# Patient Record
Sex: FEMALE | Race: OTHER | Employment: FULL TIME | URBAN - METROPOLITAN AREA
[De-identification: names, ages, dates, MRNs, and addresses within clinical notes are randomized per-mention and may not be internally consistent; named-entity substitution may affect disease eponyms.]

---

## 2017-01-07 NOTE — TELEPHONE ENCOUNTER
Dr. Mary Zavala, this pt called in requesting her results. I see that it is reviewed but there is no comment. Can you please advise.

## 2017-01-07 NOTE — TELEPHONE ENCOUNTER
There is a note by you on 12/22/2016 that the patient had an appointment with Dr Gualberto Blood on 1/7/2017. I assumed he would go over the U/S results with the patient.

## 2017-01-08 NOTE — TELEPHONE ENCOUNTER
The pelvic U-S suggests that the patient has PCOS. Patient should F-U in the office to discuss a treatment plan.

## 2017-01-09 PROBLEM — N91.2 AMENORRHEA: Status: ACTIVE | Noted: 2017-01-09

## 2017-01-09 NOTE — TELEPHONE ENCOUNTER
Pt is requesting to have a new referral to see Dr Abraham Childers (referred has )  RE-- F/U   Pt has an appt has WED 16  Please advise

## 2017-01-09 NOTE — PROGRESS NOTES
HPI:    Patient ID: Shirley Fierro is a 23year old female. HPI  43-year-old female with amenorrhea for over a year. She uses Depo-Provera with the last dose used over a year ago. She used that after conceiving.   She had breast-fed for some kaye Mother    • Parkinson's[other] Zehra Olmedo Mother       Social History:   Smoking Status: Never Smoker                      Smokeless Status: Never Used                        Alcohol Use: Yes           0.0 oz/week       0 Standard drinks or equivalent per wee

## 2017-01-11 PROBLEM — E28.2 PCOS (POLYCYSTIC OVARIAN SYNDROME): Status: ACTIVE | Noted: 2017-01-11

## 2017-01-11 NOTE — PROGRESS NOTES
[facility]  [address] [place] [id number]  Dept: [phone]  Loc: [phone]     Date:   2017    Patient:  Cosme Browning  :      1990  MRN:      [id number]    Chief SEED OR Take by mouth. Disp:  Rfl:    ibuprofen 600 MG Oral Tab Take 600 mg by mouth every 6 (six) hours as needed for Pain. Disp:  Rfl:    Probiotic Product (PROBIOTIC DAILY OR) Take by mouth.  Disp:  Rfl:      Allergies:  Codeine     Social History: water per day, Maintain a daily food journal, No drinking 30 minutes before or after meals, Utlize portion control strategies to reduce calorie intake, Identify triggers for eating and manage cues and Eat slowly and take 20 to 30 minutes to complete each m hour before bedtime. No interval changes were made in her anti-reflux medications. Pre diabetes:  Start low carb diet    Goals for next month:  1. Keep a food log. 2. Drink 48-64 ounces of non-caloric beverages per day.  No fruit juices or regular soda

## 2017-01-16 PROBLEM — N91.1 AMENORRHEA, SECONDARY: Status: ACTIVE | Noted: 2017-01-16

## 2017-01-16 NOTE — PROGRESS NOTES
HPI:    Patient ID: Ashley Bañuelos is a 23year old female. HPI  Returns to discuss lab results and plan of care. Labs showed normal fsh, tsh, and PRL. Progesterone was low.   Review of labs show that the patient's last hemoglobin A1c a few month BID.    Meds This Visit:  No prescriptions requested or ordered in this encounter       Imaging & Referrals:  None       #[id number]

## 2017-01-17 NOTE — TELEPHONE ENCOUNTER
Per pt, Dr Luli Almaraz prescribe pt her Metformin but pt needs her glucose meter, test strips and lancet send to her pharmacy on file,  Pt stts that it is her first time to use this machine.

## 2017-01-17 NOTE — TELEPHONE ENCOUNTER
Reason she is on metformin is for her polycystic ovary syndrome, not diabetes.   This is being managed by her endocrinologist.

## 2017-01-17 NOTE — TELEPHONE ENCOUNTER
Dr. Ila Zhang - see message below. I reviewed the chart and pt is getting metformin for PCOS. Do you want to order diabetes testing supplies?

## 2017-01-18 NOTE — TELEPHONE ENCOUNTER
[facility] ext [phone] today or [phone]. Thank you.

## 2017-01-18 NOTE — TELEPHONE ENCOUNTER
Recommendations per Dr. Ann-Marie Franklin reviewed. Pt verbalized understanding, agreed with information relayed, and denied further questions at this time.

## 2017-02-22 NOTE — TELEPHONE ENCOUNTER
Pt requesting immunization record but stated her  advised her she needs actual card and she doesn't have it, asking if can get a new one, please advise.

## 2017-03-08 NOTE — TELEPHONE ENCOUNTER
Patient dropped off her immunization records. Pt need her shots record on one sheet including her shots taken at the clinic for [address] [facility]. Patient travelling in 2 days. Needed by tomorrow. Contact patient when ready for pickup. Placed in Dr uTcker Herman.

## 2017-03-17 NOTE — TELEPHONE ENCOUNTER
Hopefully someone gave her her immunization records that we can print out from her computer. No need to ask me for this in the future.

## 2017-03-17 NOTE — TELEPHONE ENCOUNTER
Yes, at 23years of age patient has had a tetanus shot in 2013 and the hepatitis B injections are up-to-date along with the measles mumps and rubella.

## 2017-06-05 NOTE — TELEPHONE ENCOUNTER
Pt was concerned because she has not had a period in over a year due to pcos, explained that dr will do a pelvic to estimate how far along she is in addition to sending pt for a u/s, pt also stated she has come and go back pain that has been going on since

## 2017-06-05 NOTE — TELEPHONE ENCOUNTER
irreg periods/ back pain / poss pregnant- ap[pt for tomorrow w Dr. Valentin Verduzco she is an est pt, and having headaches looking to spk w nurse. [phone]

## 2020-03-05 ENCOUNTER — HOSPITAL ENCOUNTER (OUTPATIENT)
Age: 30
Discharge: HOME | End: 2020-03-05
Attending: EMERGENCY MEDICINE
Payer: COMMERCIAL

## 2020-03-05 VITALS
HEART RATE: 114 BPM | OXYGEN SATURATION: 98 % | TEMPERATURE: 100 F | RESPIRATION RATE: 18 BRPM | DIASTOLIC BLOOD PRESSURE: 85 MMHG | SYSTOLIC BLOOD PRESSURE: 128 MMHG

## 2020-03-05 DIAGNOSIS — J10.1 INFLUENZA B: Primary | ICD-10-CM

## 2020-03-05 LAB
POCT INFLUENZA A: NEGATIVE
POCT INFLUENZA B: POSITIVE

## 2020-03-05 PROCEDURE — 99203 OFFICE O/P NEW LOW 30 MIN: CPT

## 2020-03-05 PROCEDURE — 87502 INFLUENZA DNA AMP PROBE: CPT | Performed by: EMERGENCY MEDICINE

## 2020-03-05 PROCEDURE — 99204 OFFICE O/P NEW MOD 45 MIN: CPT

## 2020-03-05 RX ORDER — OSELTAMIVIR PHOSPHATE 75 MG/1
75 CAPSULE ORAL 2 TIMES DAILY
Qty: 10 CAPSULE | Refills: 0 | Status: SHIPPED | OUTPATIENT
Start: 2020-03-05 | End: 2020-03-10

## 2022-03-23 NOTE — ED INITIAL ASSESSMENT (HPI)
Pt here for possible stomach flu/food poisoning, pt states has been having stomach cramping, diarrhea and vomiting x 3.

## 2022-04-18 ENCOUNTER — OFFICE VISIT (OUTPATIENT)
Dept: FAMILY MEDICINE CLINIC | Facility: CLINIC | Age: 32
End: 2022-04-18
Payer: COMMERCIAL

## 2022-04-18 VITALS
HEART RATE: 71 BPM | TEMPERATURE: 98 F | DIASTOLIC BLOOD PRESSURE: 77 MMHG | HEIGHT: 63 IN | SYSTOLIC BLOOD PRESSURE: 113 MMHG | WEIGHT: 205 LBS | BODY MASS INDEX: 36.32 KG/M2

## 2022-04-18 DIAGNOSIS — E55.9 VITAMIN D DEFICIENCY: ICD-10-CM

## 2022-04-18 DIAGNOSIS — F41.9 ANXIETY: ICD-10-CM

## 2022-04-18 DIAGNOSIS — E28.2 PCOS (POLYCYSTIC OVARIAN SYNDROME): ICD-10-CM

## 2022-04-18 DIAGNOSIS — Z12.4 CERVICAL CANCER SCREENING: ICD-10-CM

## 2022-04-18 DIAGNOSIS — E66.01 SEVERE OBESITY (BMI 35.0-35.9 WITH COMORBIDITY) (HCC): ICD-10-CM

## 2022-04-18 DIAGNOSIS — Z00.00 ADULT GENERAL MEDICAL EXAM: Primary | ICD-10-CM

## 2022-04-18 PROCEDURE — 99385 PREV VISIT NEW AGE 18-39: CPT | Performed by: FAMILY MEDICINE

## 2022-04-18 PROCEDURE — 3078F DIAST BP <80 MM HG: CPT | Performed by: FAMILY MEDICINE

## 2022-04-18 PROCEDURE — 3008F BODY MASS INDEX DOCD: CPT | Performed by: FAMILY MEDICINE

## 2022-04-18 PROCEDURE — 3074F SYST BP LT 130 MM HG: CPT | Performed by: FAMILY MEDICINE

## 2022-04-18 NOTE — PATIENT INSTRUCTIONS
[facility]    [address] [address]  [phone]  [phone]

## 2022-04-21 LAB
ABSOLUTE BASOPHILS: 48 CELLS/UL (ref 0–200)
ABSOLUTE EOSINOPHILS: 162 CELLS/UL (ref 15–500)
ABSOLUTE LYMPHOCYTES: 1644 CELLS/UL (ref 850–3900)
ABSOLUTE MONOCYTES: 282 CELLS/UL (ref 200–950)
ABSOLUTE NEUTROPHILS: 3864 CELLS/UL (ref 1500–7800)
ALBUMIN/GLOBULIN RATIO: 1.6 (CALC) (ref 1–2.5)
ALBUMIN: 4.5 G/DL (ref 3.6–5.1)
ALKALINE PHOSPHATASE: 67 U/L (ref 31–125)
ALT: 158 U/L (ref 6–29)
AST: 308 U/L (ref 10–30)
BASOPHILS: 0.8 %
BILIRUBIN, TOTAL: 0.5 MG/DL (ref 0.2–1.2)
BUN: 7 MG/DL (ref 7–25)
CALCIUM: 9.3 MG/DL (ref 8.6–10.2)
CARBON DIOXIDE: 29 MMOL/L (ref 20–32)
CHLORIDE: 105 MMOL/L (ref 98–110)
CHOL/HDLC RATIO: 2.6 (CALC)
CHOLESTEROL, TOTAL: 140 MG/DL
CREATININE: 0.75 MG/DL (ref 0.5–1.1)
DHEA SULFATE: 228 MCG/DL (ref 14–349)
EGFR IF AFRICN AM: 124 ML/MIN/1.73M2
EGFR IF NONAFRICN AM: 107 ML/MIN/1.73M2
EOSINOPHILS: 2.7 %
FREE TESTOSTERONE: 4.8 PG/ML (ref 0.1–6.4)
GLOBULIN: 2.9 G/DL (CALC) (ref 1.9–3.7)
GLUCOSE: 81 MG/DL (ref 65–99)
HDL CHOLESTEROL: 54 MG/DL
HEMATOCRIT: 38.7 % (ref 35–45)
HEMOGLOBIN A1C: 5.3 % OF TOTAL HGB
HEMOGLOBIN: 12.6 G/DL (ref 11.7–15.5)
INSULIN: 10.9 UIU/ML
LDL-CHOLESTEROL: 70 MG/DL (CALC)
LYMPHOCYTES: 27.4 %
MCH: 26.5 PG (ref 27–33)
MCHC: 32.6 G/DL (ref 32–36)
MCV: 81.3 FL (ref 80–100)
MONOCYTES: 4.7 %
MPV: 10.2 FL (ref 7.5–12.5)
NEUTROPHILS: 64.4 %
NON-HDL CHOLESTEROL: 86 MG/DL (CALC)
PLATELET COUNT: 283 THOUSAND/UL (ref 140–400)
POTASSIUM: 4.8 MMOL/L (ref 3.5–5.3)
PROTEIN, TOTAL: 7.4 G/DL (ref 6.1–8.1)
RDW: 14.4 % (ref 11–15)
RED BLOOD CELL COUNT: 4.76 MILLION/UL (ref 3.8–5.1)
SODIUM: 139 MMOL/L (ref 135–146)
TESTOSTERONE, TOTAL,$/MS/MS: 57 NG/DL (ref 2–45)
TRIGLYCERIDES: 77 MG/DL
TSH W/REFLEX TO FT4: 1.03 MIU/L
VITAMIN D, 25-OH, TOTAL: 10 NG/ML (ref 30–100)
WHITE BLOOD CELL COUNT: 6 THOUSAND/UL (ref 3.8–10.8)

## 2022-05-02 ENCOUNTER — OFFICE VISIT (OUTPATIENT)
Dept: OBGYN CLINIC | Facility: CLINIC | Age: 32
End: 2022-05-02
Payer: COMMERCIAL

## 2022-05-02 VITALS
SYSTOLIC BLOOD PRESSURE: 117 MMHG | BODY MASS INDEX: 36 KG/M2 | WEIGHT: 202 LBS | HEART RATE: 63 BPM | DIASTOLIC BLOOD PRESSURE: 81 MMHG

## 2022-05-02 DIAGNOSIS — Z01.419 WOMEN'S ANNUAL ROUTINE GYNECOLOGICAL EXAMINATION: Primary | ICD-10-CM

## 2022-05-02 PROCEDURE — 99385 PREV VISIT NEW AGE 18-39: CPT | Performed by: OBSTETRICS & GYNECOLOGY

## 2022-05-02 PROCEDURE — 3079F DIAST BP 80-89 MM HG: CPT | Performed by: OBSTETRICS & GYNECOLOGY

## 2022-05-02 PROCEDURE — 3074F SYST BP LT 130 MM HG: CPT | Performed by: OBSTETRICS & GYNECOLOGY

## 2022-05-03 LAB — HPV MRNA E6/E7: NOT DETECTED

## 2022-05-10 ENCOUNTER — TELEMEDICINE (OUTPATIENT)
Dept: FAMILY MEDICINE CLINIC | Facility: CLINIC | Age: 32
End: 2022-05-10

## 2022-05-10 DIAGNOSIS — E55.9 VITAMIN D DEFICIENCY: ICD-10-CM

## 2022-05-10 DIAGNOSIS — R79.89 ELEVATED LIVER FUNCTION TESTS: Primary | ICD-10-CM

## 2022-05-10 DIAGNOSIS — E28.2 PCOS (POLYCYSTIC OVARIAN SYNDROME): ICD-10-CM

## 2022-05-10 DIAGNOSIS — R79.89 ELEVATED TESTOSTERONE LEVEL IN FEMALE: ICD-10-CM

## 2022-05-10 PROCEDURE — 99214 OFFICE O/P EST MOD 30 MIN: CPT | Performed by: FAMILY MEDICINE

## 2022-05-10 RX ORDER — ERGOCALCIFEROL 1.25 MG/1
50000 CAPSULE ORAL WEEKLY
Qty: 12 CAPSULE | Refills: 1 | Status: SHIPPED | OUTPATIENT
Start: 2022-05-10 | End: 2022-08-08

## 2022-06-28 ENCOUNTER — HOSPITAL ENCOUNTER (OUTPATIENT)
Age: 32
Discharge: HOME | End: 2022-06-28
Payer: COMMERCIAL

## 2022-06-28 VITALS
RESPIRATION RATE: 18 BRPM | SYSTOLIC BLOOD PRESSURE: 138 MMHG | TEMPERATURE: 98 F | DIASTOLIC BLOOD PRESSURE: 82 MMHG | OXYGEN SATURATION: 100 % | BODY MASS INDEX: 35.44 KG/M2 | WEIGHT: 200 LBS | HEART RATE: 86 BPM | HEIGHT: 63 IN

## 2022-06-28 DIAGNOSIS — U07.1 COVID-19: ICD-10-CM

## 2022-06-28 DIAGNOSIS — J02.9 SORE THROAT: ICD-10-CM

## 2022-06-28 DIAGNOSIS — Z20.822 ENCOUNTER FOR LABORATORY TESTING FOR COVID-19 VIRUS: Primary | ICD-10-CM

## 2022-06-28 LAB — SARS-COV-2 RNA RESP QL NAA+PROBE: DETECTED

## 2022-06-28 PROCEDURE — 99203 OFFICE O/P NEW LOW 30 MIN: CPT | Performed by: PHYSICIAN ASSISTANT

## 2022-06-28 PROCEDURE — U0002 COVID-19 LAB TEST NON-CDC: HCPCS | Performed by: PHYSICIAN ASSISTANT

## 2022-06-28 NOTE — ED INITIAL ASSESSMENT (HPI)
Patient presents a&o 4/4 with c/o sore throat, HA, chills, and body aches. Symptoms began last night.  NAD, respirations even and nonlabored

## 2022-07-11 ENCOUNTER — OFFICE VISIT (OUTPATIENT)
Dept: SURGERY | Facility: CLINIC | Age: 32
End: 2022-07-11
Payer: COMMERCIAL

## 2022-07-11 VITALS
SYSTOLIC BLOOD PRESSURE: 106 MMHG | WEIGHT: 195 LBS | DIASTOLIC BLOOD PRESSURE: 70 MMHG | HEART RATE: 63 BPM | BODY MASS INDEX: 33.29 KG/M2 | HEIGHT: 64.1 IN | OXYGEN SATURATION: 100 %

## 2022-07-11 DIAGNOSIS — E55.9 VITAMIN D DEFICIENCY: ICD-10-CM

## 2022-07-11 DIAGNOSIS — E28.2 PCOS (POLYCYSTIC OVARIAN SYNDROME): Primary | ICD-10-CM

## 2022-07-11 DIAGNOSIS — R79.89 ELEVATED LFTS: ICD-10-CM

## 2022-07-11 DIAGNOSIS — K59.00 CONSTIPATION, UNSPECIFIED CONSTIPATION TYPE: ICD-10-CM

## 2022-07-11 DIAGNOSIS — E66.9 OBESITY (BMI 30-39.9): ICD-10-CM

## 2022-07-11 PROCEDURE — 99214 OFFICE O/P EST MOD 30 MIN: CPT | Performed by: NURSE PRACTITIONER

## 2022-07-11 PROCEDURE — 3078F DIAST BP <80 MM HG: CPT | Performed by: NURSE PRACTITIONER

## 2022-07-11 PROCEDURE — 3008F BODY MASS INDEX DOCD: CPT | Performed by: NURSE PRACTITIONER

## 2022-07-11 PROCEDURE — 3074F SYST BP LT 130 MM HG: CPT | Performed by: NURSE PRACTITIONER

## 2022-07-11 RX ORDER — OMEPRAZOLE 20 MG/1
20 CAPSULE, DELAYED RELEASE ORAL DAILY
COMMUNITY
End: 2022-07-11

## 2022-07-11 RX ORDER — COVID-19 MOLECULAR TEST ASSAY
KIT MISCELLANEOUS
COMMUNITY
Start: 2022-07-02 | End: 2022-07-11

## 2022-07-11 NOTE — PATIENT INSTRUCTIONS
3 Day, 5 Day Challenge by next visit. Start 1 MD probiotic. Start magnesium glycinate 200 to 500 mg/day for sleep. Pure Encapsulations. Life Extension. NOW. Garden of Life. Or consider Natural Calm. by Blair Mathew  Follow dosage instructions. Start 1 teaspoon and increase up to 3 teaspoons as needed for sleep. Daily Calories:  Track food and beverage intake daily on a phone sushant: My Fitness Pal, Carb Manager, Lose It, My Net Diary   120-174 lbs: 1200 calories/day. 175-219 lbs: 1500 calories/day. 220-249 lbs: 1800 calories/day. 250 lbs or more: 2,000 calories/day. Record food and drink intake on a written log or phone sushant:   Aim for  grams of carbs/day. Carb Manager. Aim for a whole, plant strong, low glycemic index dietary pattern. Aim for protein + produce at each meal time. Keep meals between 7 am and 7 pm.    Aim for 3 healthy meals a day with 1-2 healthy snacks as needed. Daily- Aim for:  2 fruits: tomato, avocado, apples, oranges, berries   4 handfuls non-starchy vegetables: greens, peppers, onion, garlic, broccoli, cauliflower, asparagus, brussels sprouts   2 starches: real potato (sweet, white), green beans, carrots, corn, beans, lentils, chickpeas, quinoa, cous cous  3 protein per day: nuts, seeds, plants (lentils, chickpeas, beans), chicken, fish, seafood, turkey, pork, red meat (limited)  Aim for 2 servings healthy fats per day: olives, fatty fish, olive oil, seeds, nuts, avocado, coconut oil. Breakfast ideas:  1. Fruit and nuts/seeds. 2. Eggs scrambled with vegetables, cottage cheese. 3. Oatmeal (stovetop), cinnamon, 2 tbsp flaxseed, berries. 4. Protein shake: 1-2 scoops protein powder, shake with ice and water. Garden of Life. Nutiva. Orgain. Aim for protein and vegetables for lunch and dinner. Healthy Plate method:   1/2 vegetables, 1/4 protein, 1/4 healthy starch (may decrease this portion).      Snacks:  Raw vegetables and hummus, apples and peanut butter, nuts, seeds, fruit, pecans with organic honey drizzled. 1. Do a house cleanse, remove unhealthy foods from the house. 2. Aim to eat a whole, plant strong, low glycemic index diet. 3. Focus on the quality of your diet. 4. Get in the habit of recording everything you eat and drink using a food log.  5. Write down all your meals/drinks to stay accountable for what you eat and drink. 6. Find a regular pattern for meals. Aim for 3 healthy meals a day with 1-2 healthy snacks. 7. Plan when, where, and what you will eat at each meal in advance to make sure you do not go too many hours without eating. 8. Include lean protein throughout the day to help steady your appetite. 9. Eat at least 4 servings of vegetables and 2 servings for fruits each day. 10. Add fiber to slow down digestion and keep you full longer. 11. Cut out sugar sweetened beverages. 12. Avoid highly processed carbohydrates. 13. Use the healthy plate method as a reference: Lunch & Dinner plate: 1/2 vegetables (and some fruit), 1/4 protein, 1/4 healthy starch (may decrease this portion). 14. Aim to lose 1 to 2 lbs per week. 15. Become more physically active. Aim for 150 minutes of moderate/vigorous activity per week. 16. Aim to sleep 7-8 hours per night. 17. Stress less. Meditate. 25. Connect with others, loneliness can contribute to disease. 19. Aim to drink at least 64 oz of water a day, you can increase this to half your body weight in ounces/day.      Check on insurance coverage for weight loss medications/dietican:  Qsymia  *Phentermine + Topiramate  Felisha Wong  *Wellbutrin

## 2022-08-24 ENCOUNTER — TELEMEDICINE (OUTPATIENT)
Dept: SURGERY | Facility: CLINIC | Age: 32
End: 2022-08-24

## 2022-08-24 VITALS — WEIGHT: 194 LBS | BODY MASS INDEX: 33 KG/M2

## 2022-08-24 DIAGNOSIS — K59.00 CONSTIPATION, UNSPECIFIED CONSTIPATION TYPE: ICD-10-CM

## 2022-08-24 DIAGNOSIS — E66.9 OBESITY (BMI 30-39.9): ICD-10-CM

## 2022-08-24 DIAGNOSIS — E55.9 VITAMIN D DEFICIENCY: ICD-10-CM

## 2022-08-24 DIAGNOSIS — E28.2 PCOS (POLYCYSTIC OVARIAN SYNDROME): Primary | ICD-10-CM

## 2022-08-24 DIAGNOSIS — R79.89 ELEVATED LFTS: ICD-10-CM

## 2022-08-24 PROCEDURE — 99213 OFFICE O/P EST LOW 20 MIN: CPT | Performed by: NURSE PRACTITIONER

## 2022-11-17 ENCOUNTER — PATIENT MESSAGE (OUTPATIENT)
Dept: FAMILY MEDICINE CLINIC | Facility: CLINIC | Age: 32
End: 2022-11-17

## 2022-11-17 NOTE — TELEPHONE ENCOUNTER
From: Ronnell Gun Jesus Lindi Cogan  To: Chastity Hall DO  Sent: 11/17/2022 9:54 AM CST  Subject: Physical Form    Hi there,    I was wondering if I can get forwarded [employer] physical form for 6th grade. It looks like that had this completed on 1/24/22. I am not able to print out the state form format thru the my chart sushant.    thank you!

## 2023-01-04 NOTE — DISCHARGE INSTRUCTIONS
Flu and COVID are negative today  Please drink plenty of fluids  Take ibuprofen (Motrin, Advil) 600 mg every 6 hours for fever/aches, take with food  OR  Acetaminophen (Tylenol) 650-1000 mg every 6 hours for fever/aches  Rest!  Mucinex DM twice per day for 7 days, with plenty of fluids  Seek medical care if your symptoms worsen  See your primary care provider in 5 days if no improvement

## 2025-02-04 NOTE — PROGRESS NOTES
Patient ID: Ruthie Anderson is a 34 year old female.    HPI  Chief Complaint   Patient presents with    Routine Physical     Last physical done on 04/18/2022. Pt is here for a physical but also would like to consult on anxiety and weight loss.     Pt works with [employer]. She doesn't smoke cigarettes but vapes and smokes marijuana. She has 4 kids.  She is a single mother.    I reviewed pt's vitals. Pt just recently got insurance again and would like lab work done. She is due for a cervical cancer screening, I provided a referral to gynecology. Pt is UTD on immunizations and received her flu shot in Jan, 2025.     Pt states that she has anxiety and smokes marijuana to manage the stress. Denies depression. She has four kids and is a single mother and just is overwhelmed by having to take care of everything. She smokes immediately after work before the kids get home, and then will smoke again before bed. She was in therapy and felt that it provided relief, she is not currently. Pt is concerned about memory issues while she is smoking. Both her mother and her grandmother had alzheimer's and she is concerned that the memory issues could  be linked to this. I let her know that this is much more consistent with daily marijuana use.  Pt would overall not like to be reliant on marijuana to manage stress. She is open to taking anxiety medication, I discussed with her.     She also would like to consult on weight loss. Pt has been struggling with weight loss for years and wanted to discuss injectable weight loss medications. She already has an appointment scheduled with a weight loss specialist  on 3/12/2025. Hx binge eating due to stress as well. I discussed with her.     Health Maintenance   Topic Date Due    Annual Physical  04/18/2023    DTaP,Tdap,and Td Vaccines (6 - Td or Tdap) 09/05/2023    COVID-19 Vaccine (4 - 2024-25 season) 09/01/2024    Influenza Vaccine (1) 10/01/2024    Annual Depression Screening   01/01/2025    Pap Smear  05/02/2025    Pneumococcal Vaccine: Birth to 50yrs  Aged Out    Meningococcal B Vaccine  Aged Out       =======================================================    Lab Results   Component Value Date    WBC 6.0 04/18/2022    RBC 4.76 04/18/2022    HGB 12.6 04/18/2022    HCT 38.7 04/18/2022     04/18/2022    MPV 8.5 10/03/2016    MCV 81.3 04/18/2022    MCH 26.5 (L) 04/18/2022    MCHC 32.6 04/18/2022    RDW 14.4 04/18/2022    NEUT 5.8 07/02/2016    LYMPH 2.0 07/02/2016    MON 0.7 07/02/2016    EOS 0.2 07/02/2016    BASO 0.1 07/02/2016    NEPERCENT 64.4 04/18/2022    LYPERCENT 27.4 04/18/2022    MOPERCENT 4.7 04/18/2022    EOPERCENT 2.7 04/18/2022    BAPERCENT 0.8 04/18/2022    NE 3,864 04/18/2022    LYMABS 1,644 04/18/2022    MOABSO 282 04/18/2022    EOABSO 162 04/18/2022    BAABSO 48 04/18/2022       Lab Results   Component Value Date    GLU 81 04/18/2022    BUN 7 04/18/2022    BUNCREA NOT APPLICABLE 04/18/2022    CREATSERUM 0.75 04/18/2022    ANIONGAP 6 10/03/2016    GFRNAA 107 04/18/2022    GFRAA 124 04/18/2022    CA 9.3 04/18/2022    OSMOCALC 290 10/03/2016    ALKPHO 67 04/18/2022     (H) 04/18/2022     (H) 04/18/2022    ALKPHOS 78 07/02/2016    BILT 0.5 04/18/2022    TP 7.4 04/18/2022    ALB 4.5 04/18/2022    GLOBULIN 2.9 04/18/2022    AGRATIO 1.6 04/18/2022     04/18/2022    K 4.8 04/18/2022     04/18/2022    CO2 29 04/18/2022       Lab Results   Component Value Date    GLU 81 04/18/2022    BUN 7 04/18/2022    CREATSERUM 0.75 04/18/2022    BUNCREA NOT APPLICABLE 04/18/2022    ANIONGAP 6 10/03/2016    GFRAA 124 04/18/2022    GFRNAA 107 04/18/2022    CA 9.3 04/18/2022     04/18/2022    K 4.8 04/18/2022     04/18/2022    CO2 29 04/18/2022    OSMOCALC 290 10/03/2016       Lab Results   Component Value Date    COLORUR Yellow 04/10/2013    CLARITY Hazy (A) 04/10/2013    SPECGRAVITY >=1.030 03/23/2022    GLUUR Negative 03/23/2022    BILUR NEG  04/10/2013    KETUR NEG 04/10/2013    BLOODURINE NEG 04/10/2013    PHURINE 6.0 04/10/2013    PROUR NEG 04/10/2013    UROBILINOGEN <2.0 04/10/2013    NITRITE NEG 04/10/2013    LEUUR MOD (A) 04/10/2013    ASCACIDURINE NEG 04/10/2013    WBCUR 1 04/10/2013    RBCUR 3 04/10/2013    EPIUR FEW 04/10/2013    BACUR FEW (A) 04/10/2013    MICCOM Completed 04/10/2013     Lab Results   Component Value Date    A1C 5.3 04/18/2022    A1C 5.2 10/03/2016       Lab Results   Component Value Date    CHOLEST 140 04/18/2022    TRIG 77 04/18/2022    HDL 54 04/18/2022    LDL 70 04/18/2022    TCHDLRATIO 2.6 04/18/2022    NONHDLC 86 04/18/2022     TSH (uIU/mL)   Date Value   01/09/2017 1.60     TSH (S) (uIU/mL)   Date Value   07/31/2014 0.94     TSH W/REFLEX TO FT4 (mIU/L)   Date Value   04/18/2022 1.03       Lab Results   Component Value Date    B12 819 10/03/2016       Lab Results   Component Value Date    VITD 10 (L) 04/18/2022    MZPW67JA 16.8 08/26/2016       =======================================================    Wt Readings from Last 6 Encounters:   02/04/25 200 lb   01/04/23 182 lb   08/24/22 194 lb   07/11/22 195 lb   06/28/22 200 lb   05/02/22 202 lb               BMI Readings from Last 6 Encounters:   02/04/25 35.43 kg/m²   01/04/23 32.24 kg/m²   08/24/22 33.20 kg/m²   07/11/22 33.37 kg/m²   06/28/22 35.43 kg/m²   05/02/22 35.78 kg/m²       BP Readings from Last 6 Encounters:   02/04/25 110/74   01/04/23 121/60   07/11/22 106/70   06/28/22 138/82   05/02/22 117/81   04/18/22 113/77         Review of Systems   Respiratory:  Negative for shortness of breath.    Cardiovascular:  Negative for chest pain.   Psychiatric/Behavioral:  Negative for dysphoric mood. The patient is nervous/anxious.          Past Medical History:    Diabetes mellitus (HCC)    Per pt, Pre Diabetic    History of ovarian cyst    Irregular periods    Obesity    Obesity (BMI 30-39.9)    PCOS (polycystic ovarian syndrome)    Prediabetes    Pregnancy (HCC)    per  [facility]:  \"Pregnancy\"; \"Management:  \"; \"Facility:  [facility]\"; \"Outcome/detail:  40 week 6lbs 12 oz Female\"; \"Comments:  patient was induced\"    Pregnancy (HCC)    per [facility]:  \"Pregnancy\"; \"Management:  \"; \"Provider:  Last [facility]\"; \"Comments:  Chester was a full term live born 6 pounds 12 ounces, delivered by \"       Past Surgical History:   Procedure Laterality Date      ,     Removal gallbladder      Tubal ligation         Social History     Socioeconomic History    Marital status: Legally      Spouse name: Not on file    Number of children: Not on file    Years of education: Not on file    Highest education level: Not on file   Occupational History    Not on file   Tobacco Use    Smoking status: Never    Smokeless tobacco: Never   Vaping Use    Vaping status: Never Used   Substance and Sexual Activity    Alcohol use: Not Currently     Comment: 1 glass of wine/month    Drug use: Never    Sexual activity: Yes   Other Topics Concern     Service Not Asked    Blood Transfusions Not Asked    Caffeine Concern Yes     Comment: Tea, 1 cup daily    Occupational Exposure Not Asked    Hobby Hazards Not Asked    Sleep Concern Not Asked    Stress Concern Not Asked    Weight Concern Not Asked    Special Diet Not Asked    Back Care Not Asked    Exercise Not Asked    Bike Helmet Not Asked    Seat Belt Not Asked    Self-Exams Not Asked   Social History Narrative    ** Merged History Encounter **          Social Drivers of Health     Food Insecurity: No Food Insecurity (2025)    NCSS - Food Insecurity     Worried About Running Out of Food in the Last Year: No     Ran Out of Food in the Last Year: No   Transportation Needs: No Transportation Needs (2025)    NCSS - Transportation     Lack of Transportation: No   Stress: Not on file   Housing Stability: Not At Risk (2025)    NCSS - Housing/Utilities     Has Housing: Yes     Worried About Losing Housing: No     Unable  to Get Utilities: No          No current outpatient medications on file.     Allergies:Allergies[1]   PHYSICAL EXAM:   Physical Exam    Physical Exam   Constitutional: . She appears well-developed and well-nourished. No distress.   Head: Normocephalic.   Right Ear: Tympanic membrane and ear canal normal.   Left Ear: Tympanic membrane and ear canal normal.   Nose: No rhinorrhea. Pale boggy nasal tissue.   Mouth/Throat: Oropharynx is clear and moist and mucous membranes are normal.   Eyes: Conjunctivae and EOM are normal. Pupils are equal, round, and reactive to light.   Neck: Normal range of motion. Neck supple. No thyromegaly present.   Cardiovascular: Normal rate, regular rhythm and no murmur heard.   Pulmonary/Chest: Effort normal and breath sounds normal. No respiratory distress.   Abdominal: Soft. Bowel sounds are normal. There is no hepatosplenomegaly. There is no tenderness.   Lymphadenopathy: She has no cervical adenopathy.   Neurological: She is alert and oriented to person, place, and time. She has normal reflexes. No cranial nerve deficit.   Skin: Skin is warm and dry. No rash noted.   Psychiatric: She has a normal mood and affect.  Lower legs: No edema of the legs bilaterally.     Vitals reviewed.    Blood pressure 110/74, pulse 61, temperature 98.1 °F (36.7 °C), temperature source Tympanic, height 5' 3\" (1.6 m), weight 200 lb, last menstrual period 01/10/2025, not currently breastfeeding.         ASSESSMENT/PLAN:     Diagnoses and all orders for this visit:    Adult general medical exam  -     CBC With Differential With Platelet  -     Comp Metabolic Panel (14)  -     Lipid Panel  -     Assay, Thyroid Stim Hormone  Fasting labs ordered for [facility]  Anxiety  -     sertraline 50 MG Oral Tab; Take 1/2 tablet po daily for 1 week and then go to 1 full tablet each day from then on.  (for mood)  We talked about why an SSRI could be very helpful for her.  I think this may help with the anxiety and the stress in  life and hopefully get her to decrease the amount of marijuana and hopefully get her to quit.  She states that her therapist thought she was doing well enough that she did not need to continue therapy.  Patient denies any depression or suicidal ideation.  Cervical cancer screening  -     OBG - INTERNAL    Marijuana use  As above  Binge eating  Await labs  Severe obesity (BMI 35.0-35.9 with comorbidity) (HCC)  Await labs but already has an appointment with the weight loss specialist  Elevated testosterone level  -     TESTOSTERONE TOTAL [873] [Q]  History of elevated testosterone and we will recheck       Referrals (if applicable)  Orders Placed This Encounter   Procedures    OBG - INTERNAL     OB/GYN.     Referral Priority:   Routine     Referral Type:   OFFICE VISIT     Referred to Provider:   Anh Link PA-C     Requested Specialty:   OBSTETRICS & GYNECOLOGY     Number of Visits Requested:   3       Follow up if symptoms persist.  Take medicine (if given) as prescribed.  Approach to treatment discussed and patient/family member understands and agrees to plan.     No follow-ups on file.    There are no Patient Instructions on file for this visit.    Jessica Wilde    2/4/2025    By signing my name below, IJessica,  attest that this documentation has been prepared under the direction and in the presence of Lauro Samano DO.   Electronically Signed: Jessica Wilde, 2/4/2025, 1:47 PM.    I, Lauro Samano DO,  personally performed the services described in this documentation. All medical record entries made by the scribe were at my direction and in my presence.  I have reviewed the chart and discharge instructions (if applicable) and agree that the record reflects my personal performance and is accurate and complete.  Lauro Samano DO, 2/4/2025, 2:18 PM              [1]   Allergies  Allergen Reactions    Codeine HIVES    Codeine ITCHING

## 2025-02-05 NOTE — PROGRESS NOTES
[facility]  Obstetrics and Gynecology  Annual  Anh Link PA-C    Chief Complaint   Patient presents with    New Patient    Gynecologic Exam     Reviewed Preventative/Wellness form with patient.        Ruthie Anderson is a 34 year old female  is a new patient to me presenting for her annual well woman exam. Patient's last menstrual period was 01/10/2025 (approximate). Cycles are regular with menses lasting about 5-7 days with heavy to light flow. S/p tubal ligation. Sexually active with new partner, would like STD screening. Up to date on pap smear. Notes a small subdermal cyst for the last 10 years in left breast/sternum area. Non-tender, no surrounding redness and no drainage. States she has been able to drain it in the past. No other breast masses or pain. No abnormal vaginal discharge, odor, irritation, or itching.     Pap:   Contraception:tubal ligation    OBSTETRICS HISTORY:     OB History    Para Term  AB Living   4 4 4 0 0 4   SAB IAB Ectopic Multiple Live Births   0 0 0   4      # Outcome Date GA Lbr Harinder/2nd Weight Sex Type Anes PTL Lv   4 Term     F Vag-Spont   ALINA   3 Term     F Vag-Spont   ALINA   2 Term     M NORMAL SPONT   ALINA   1 Term     F NORMAL SPONT   ALINA       GYNE HISTORY:     Menarche: 15y/o (2025  3:02 PM)  Period Cycle (Days): 30days (2025  3:02 PM)  Period Duration (Days): 5-7 days (2025  3:02 PM)  Period Flow: 3 days heavy to light (2025  3:02 PM)  Use of Birth Control (if yes, specify type): Tubal Ligation (2025  3:02 PM)  Hx Prior Abnormal Pap: No (2025  3:02 PM)      History   Sexual Activity    Sexual activity: Yes           2022     9:40 AM   RECENT PAP RESULTS   INTERPRETATION/RESULT: --          MEDICAL HISTORY:     Past Medical History:   Diagnosis Date    Diabetes mellitus (HCC)     Per pt, Pre Diabetic    History of ovarian cyst     Irregular periods     Obesity     Obesity (BMI 30-39.9)     PCOS (polycystic ovarian  syndrome)     Prediabetes     Pregnancy ([facility])     per NextGen:  \"Pregnancy\"; \"Management:  \"; \"Facility:  [facility]\"; \"Outcome/detail:  40 week 6lbs 12 oz Female\"; \"Comments:  patient was induced\"    Pregnancy (HCC)     per NextGen:  \"Pregnancy\"; \"Management:  \"; \"Provider:  Last Mancilla\"; \"Comments:  Chester was a full term live born 6 pounds 12 ounces, delivered by \"      Past Surgical History:   Procedure Laterality Date      ,     Removal gallbladder      Tubal ligation         SOCIAL HISTORY:     Tobacco Use: Low Risk  (2025)    Patient History     Smoking Tobacco Use: Never     Smokeless Tobacco Use: Never     Passive Exposure: Not on file       Depression Screening:   Depression Screening (PHQ-2/PHQ-9): Over the LAST 2 WEEKS   Little interest or pleasure in doing things (over the last two weeks)?: Not at all  Little interest or pleasure in doing things: Not at all    Feeling down, depressed, or hopeless (over the last two weeks)?: Not at all  Feeling down, depressed, or hopeless: Not at all    PHQ-2 SCORE: 0  PHQ-2 SCORE: 0          FAMILY HISTORY:     Family History   Problem Relation Age of Onset    Diabetes Other         per NextGen:  \"Family h/o diabetes\"    Hypertension Mother     Diabetes Mother     Other (Parkinson's[other]) Mother        MEDICATIONS:       Current Outpatient Medications:     sertraline 50 MG Oral Tab, Take 1/2 tablet po daily for 1 week and then go to 1 full tablet each day from then on.  (for mood), Disp: 90 tablet, Rfl: 0    ALLERGIES:     Allergies[1]    REVIEW OF SYSTEMS:     Review of Systems   Constitutional:  Negative for chills, fever and unexpected weight change.   Respiratory: Negative.     Cardiovascular: Negative.    Gastrointestinal:  Negative for abdominal pain, constipation, diarrhea and nausea.   Genitourinary:  Negative for dyspareunia, dysuria, genital sores, hematuria, menstrual problem, pelvic pain, vaginal bleeding,  vaginal discharge and vaginal pain.   Musculoskeletal: Negative.    Skin: Negative.    Neurological: Negative.    Hematological: Negative.    Psychiatric/Behavioral: Negative.           PHYSICAL EXAM:     Vitals:    02/05/25 1459   BP: 121/76   Weight: 195 lb (88.5 kg)       Body mass index is 34.54 kg/m².     Constitutional: well developed, well nourished  Psychiatric:  Oriented to time, place, person and situation. Appropriate mood and affect  Head/Face: normocephalic  Neck/Thyroid: thyroid symmetric, no thyromegaly, no nodules, no adenopathy  Lymphatic:no abnormal supraclavicular or axillary adenopathy is noted  Breast: Adjacent to left breast on sternum is a firm 1 cm subdermal cyst without tenderness, erythema, edema, or drainage. Otherwise bilateral breasts normal without palpable masses, tenderness, asymmetry, nipple discharge, nipple retraction or skin changes  Abdomen:  soft, nontender, nondistended, no masses  Skin/Hair: no unusual rashes or bruises  Extremities: no edema, no cyanosis      ASSESSMENT:     Ruthie was seen today for new patient and gynecologic exam.    Diagnoses and all orders for this visit:    Screening examination for STD (sexually transmitted disease)  -     HCV Antibody  -     Hepatitis B Surface Antigen  -     HIV AG AB Combo  -     T PALLIDUM SCREENING CASCADE  -     Chlamydia/Gc Amplification    Encounter for well woman exam        PLAN:   Normal exam. STD screening ordered.  Recommend repeat pap smear with co-testing every 3 years for normal/ -HPV.  Contraceptive counseling completed.  Screening mammogram recommended starting at 40 unless significant family history or concerning symptoms.  Maintain healthy lifestyle with well-balance diet and daily exercise.   Return to clinic in one year or as needed.        SUMMARY:  Pap: Next cotest 3-5 years per ASCCP guidelines.  BCM:  Tubal Ligation  STD screening: GC/Chl/Trich/HepB/HepC/HIV/RPR, condoms encouraged  Mammogram: n/a -- once 41  yrs old  HM updated    FOLLOW-UP     No follow-ups on file.    SERENA SANCHEZ PA-C  3:08 PM  2/5/2025    Note to patient and family:  The 21st Century Cures Act makes medical notes available to patients in the interest of transparency.  However, please be advised that this is a medical document.  It is intended as a peer to peer communication.  It is written in medical language and may contain abbreviations or verbiage that are technical and unfamiliar.  It may appear blunt or direct.  Medical documents are intended to carry relevant information, facts as evident, and the clinical opinion of the practitioner.         [1]   Allergies  Allergen Reactions    Codeine HIVES    Codeine ITCHING

## 2025-03-12 NOTE — TELEPHONE ENCOUNTER
Attempted to reach patient for telemedicine visit today. No answer. VM left for patient to call and reschedule appointment. E-mail and text message links sent to patient x 2.

## 2025-04-23 NOTE — PROGRESS NOTES
HPI:     HPI  A 34-year-old woman presents with lower back pain and  urinary frequency for the past four days. She reports that she missed a step on the concrete outside her house and fell onto her buttocks. The back pain is localized, and she has visible bruising. Despite taking 800 mg of ibuprofen, she has not experienced any relief.    She denies experiencing weakness, numbness, tingling sensations, or any issues with rectal or bladder incontinence.      Medications:   Current Medications[1]    Allergies:   Allergies[2]    History:     Health Maintenance   Topic Date Due    COVID-19 Vaccine (4 - 2024-25 season) 09/01/2024    Pap Smear  05/02/2025    Annual Physical  02/04/2026    DTaP,Tdap,and Td Vaccines (8 - Td or Tdap) 01/24/2029    Influenza Vaccine  Completed    Annual Depression Screening  Completed    Pneumococcal Vaccine: Birth to 50yrs  Aged Out    Meningococcal B Vaccine  Aged Out       Patient's last menstrual period was 04/12/2025 (approximate).   Past Medical History:   Past Medical History[3]    Past Surgical History:   Past Surgical History[4]    Family History:   Family History[5]    Social History:     Social History     Socioeconomic History    Marital status: Legally      Spouse name: Not on file    Number of children: Not on file    Years of education: Not on file    Highest education level: Not on file   Occupational History    Not on file   Tobacco Use    Smoking status: Never    Smokeless tobacco: Never   Vaping Use    Vaping status: Never Used   Substance and Sexual Activity    Alcohol use: Not Currently     Comment: 1 glass of wine/month    Drug use: Never    Sexual activity: Yes   Other Topics Concern     Service Not Asked    Blood Transfusions Not Asked    Caffeine Concern Yes     Comment: Tea, 1 cup daily    Occupational Exposure Not Asked    Hobby Hazards Not Asked    Sleep Concern Not Asked    Stress Concern Not Asked    Weight Concern Not Asked    Special Diet Not  Asked    Back Care Not Asked    Exercise Not Asked    Bike Helmet Not Asked    Seat Belt Not Asked    Self-Exams Not Asked   Social History Narrative    ** Merged History Encounter **          Social Drivers of Health     Food Insecurity: No Food Insecurity (2/4/2025)    NCSS - Food Insecurity     Worried About Running Out of Food in the Last Year: No     Ran Out of Food in the Last Year: No   Transportation Needs: No Transportation Needs (2/4/2025)    NCSS - Transportation     Lack of Transportation: No   Stress: Not on file   Housing Stability: Not At Risk (2/4/2025)    NCSS - Housing/Utilities     Has Housing: Yes     Worried About Losing Housing: No     Unable to Get Utilities: No       Review of Systems:   Review of Systems   Musculoskeletal:  Positive for back pain.        Vitals:    04/23/25 1158   BP: 106/70   Pulse: 72   Weight: 206 lb (93.4 kg)   Height: 5' 3\" (1.6 m)     Body mass index is 36.49 kg/m².    Physical Exam:   Physical Exam  Vitals reviewed.   Skin:     Findings: Bruising present.      There are bruises on the buttock.    Assessment and Plan::     Assessment & Plan  Urinary frequency    Orders:    POC Urinalysis, Manual Dip without microscopy [15296]    Back pain without sciatica    Orders:    methylPREDNISolone (MEDROL) 4 MG Oral Tablet Therapy Pack; As directed.    XR LUMBAR SPINE (MIN 4 VIEWS) (CPT=72110); Future    XR SACRUM + COCCYX (MIN 2 VIEWS) (CPT=72220); Future  Advise the patient to apply ice compresses.    Fall, initial encounter    Orders:    XR LUMBAR SPINE (MIN 4 VIEWS) (CPT=72110); Future    XR SACRUM + COCCYX (MIN 2 VIEWS) (CPT=72220); Future       Discussed plan of care with pt and pt is in agreement.All questions answered. Pt to call with questions or concerns.         [1]   Current Outpatient Medications   Medication Sig Dispense Refill    methylPREDNISolone (MEDROL) 4 MG Oral Tablet Therapy Pack As directed. 21 each 0    sertraline 50 MG Oral Tab Take 1/2 tablet po daily  for 1 week and then go to 1 full tablet each day from then on.  (for mood) 90 tablet 0   [2]   Allergies  Allergen Reactions    Codeine HIVES    Codeine ITCHING   [3]   Past Medical History:   Diabetes mellitus (HCC)    Per pt, Pre Diabetic    History of ovarian cyst    Irregular periods    Obesity    Obesity (BMI 30-39.9)    PCOS (polycystic ovarian syndrome)    Prediabetes    Pregnancy (HCC)    per NextGen:  \"Pregnancy\"; \"Management:  \"; \"Facility:  [facility]\"; \"Outcome/detail:  40 week 6lbs 12 oz Female\"; \"Comments:  patient was induced\"    Pregnancy (HCC)    per NextGen:  \"Pregnancy\"; \"Management:  \"; \"Provider:  Last Mancilla\"; \"Comments:  Chester was a full term live born 6 pounds 12 ounces, delivered by \"   [4]   Past Surgical History:  Procedure Laterality Date      ,     Removal gallbladder      Tubal ligation     [5]   Family History  Problem Relation Age of Onset    Diabetes Other         per NextGen:  \"Family h/o diabetes\"    Hypertension Mother     Diabetes Mother     Other (Parkinson's[other]) Mother

## 2025-04-28 NOTE — TELEPHONE ENCOUNTER
Per patient , Martin provided her a letter  and return date to work is today .   She is still in pain, her back still hurting  her, she can't drive due to the pain.   She is almost done  with the prednisone and still taking the tylenol.   Her Xray showed no fracture.   She is requesting an extension letter, with unknown date of return.   She might apply for the STD .       No future appointments.      LAST VISIT 4/23/25;  Discussed plan of care with pt and pt is in agreement.All questions answered. Pt to call with questions or concerns.       PER CHART REVIEW, Martin is not in the office today but she'll be back tomorrow Tuesday .   RN contacted the patient and informed about  above schedule.   Appointment scheduled for tomorrow with DR Samano, and there is no need to send the message and request to Martin.   Instructed to discuss the extension  letter to DR Samano tomorrow and she agrees.      Future Appointments   Date Time Provider Department Center   4/29/2025  9:30 AM Lauro Samano DO [facility]

## 2025-04-29 NOTE — PROGRESS NOTES
Patient ID: Ruthie Anderson is a 34 year old female.         The following individual(s) verbally consented to be recorded using ambient AI listening technology and understand that they can each withdraw their consent to this listening technology at any point by asking the clinician to turn off or pause the recording:    Patient name: Ruthie Anderson  Additional names:           HPI  Chief Complaint   Patient presents with    Back Pain     Follow up     Note For Work       History of Present Illness  On Easter she was at a friend's house walking down the stairs and missed the last 3 steps falling on her buttock.  She needed help getting up but otherwise felt okay until she got in the car as to the driving home when the pain increased.  She been using the lidocaine patch and doing some ice and heat but she is still in quite a bit of pain.  She saw our physician assistant on 4/23/2025 and I reviewed that note.  She had x-rays of her lumbar spine and sacrum and there is no fractures.  Patient states the worst pain is getting up from the sitting position which is where she will have terrible pain in the low back in the middle.  She states she had some bruising on her buttocks where she fell but that is resolved.  There is no loss of bowel or bladder control.  She is not having shooting pains down her legs.  She is not sure what else to do as her discomfort is debilitating at times.  She has been unable to go back to work due to the pain.  She sits quite a bit for work and is unable to do this.    Wt Readings from Last 6 Encounters:   04/29/25 213 lb (96.6 kg)   04/23/25 206 lb (93.4 kg)   02/05/25 195 lb (88.5 kg)   02/04/25 200 lb (90.7 kg)   01/04/23 182 lb (82.6 kg)   08/24/22 194 lb (88 kg)       BMI Readings from Last 6 Encounters:   04/29/25 37.73 kg/m²   04/23/25 36.49 kg/m²   02/05/25 34.54 kg/m²   02/04/25 35.43 kg/m²   01/04/23 32.24 kg/m²   08/24/22 33.20 kg/m²       BP Readings  from Last 6 Encounters:   25 105/68   25 106/70   25 121/76   25 110/74   23 121/60   22 106/70       Results      Review of Systems  No exertional cardiac chest pain or shortness of breath unless stated in HPI which would take precedence.  See HPI for further review of systems.        Medical History:      Past Medical History:    Diabetes mellitus (HCC)    Per pt, Pre Diabetic    History of ovarian cyst    Irregular periods    Obesity    Obesity (BMI 30-39.9)    PCOS (polycystic ovarian syndrome)    Prediabetes    Pregnancy (HCC)    per NextGen:  \"Pregnancy\"; \"Management:  \"; \"Facility:  [facility]\"; \"Outcome/detail:  40 week 6lbs 12 oz Female\"; \"Comments:  patient was induced\"    Pregnancy (HCC)    per NextGen:  \"Pregnancy\"; \"Management:  \"; \"Provider:  Last Mancilla\"; \"Comments:  Chester was a full term live born 6 pounds 12 ounces, delivered by \"       Past Surgical History:   Procedure Laterality Date      ,     Removal gallbladder      Tubal ligation            Current Outpatient Medications   Medication Sig Dispense Refill    cyclobenzaprine 10 MG Oral Tab Take 1 tablet (10 mg total) by mouth 3 (three) times daily as needed for Muscle spasms. As needed for muscle relaxation. Can make tired. 30 tablet 0    naproxen 500 MG Oral Tab Take 1 tablet (500 mg total) by mouth 2 (two) times daily with meals. Take with meals (for pain/inflammation) 60 tablet 0    sertraline 50 MG Oral Tab Take 1/2 tablet po daily for 1 week and then go to 1 full tablet each day from then on.  (for mood) 90 tablet 0     Allergies:  Allergies   Allergen Reactions    Codeine HIVES    Codeine ITCHING        Physical Exam:       Physical Exam  Blood pressure 105/68, pulse 76, temperature 97.7 °F (36.5 °C), temperature source Tympanic, height 5' 3\" (1.6 m), weight 213 lb (96.6 kg), last menstrual period 2025, not currently breastfeeding.       Constitutional: Patient  is oriented to person, place, and time. Patient appears well-developed and well-nourished. No distress.     Vitals have been reviewed.    Physical Exam   Constitutional: Patient is oriented to person, place, and time. Patient appears well-developed and well-nourished. No distress but definitely in some pain here..    Neurological: Patient is alert and oriented to person, place, and time.   Skin: Skin is warm and dry.  No pallor or diaphoresis.  Psychiatric: Patient has a anxious affect and was crying in the visit because of her inability to move around and take care of her children as well as she would like to after this injury to her back.  Legs: No pitting edema.    Nursing note and vitals reviewed.    --------------------------------------------------------------  BACK:     Tenderness over the lumbar paraspinal muscles from L4-S1 bilaterally but especially over the sacrum down the midline.  No crepitus.  I did not see any bruising at this time.  Gait was antalgic and she needed help standing up from a sitting position due to the discomfort.    ----------------------------------------------------------------      Physical Exam        Assessment/Plan:        Diagnoses and all orders for this visit:    Acute bilateral low back pain without sciatica  -     cyclobenzaprine 10 MG Oral Tab; Take 1 tablet (10 mg total) by mouth 3 (three) times daily as needed for Muscle spasms. As needed for muscle relaxation. Can make tired.  -     naproxen 500 MG Oral Tab; Take 1 tablet (500 mg total) by mouth 2 (two) times daily with meals. Take with meals (for pain/inflammation)  She is already using a lidocaine patch and I told her she can continue.  She states that the Medrol pack did not help at all.  Will get her started on naproxen and cyclobenzaprine as directed.  I demonstrated some stretching that she can do as well.  Bilateral buttock pain  -     cyclobenzaprine 10 MG Oral Tab; Take 1 tablet (10 mg total) by mouth 3 (three)  times daily as needed for Muscle spasms. As needed for muscle relaxation. Can make tired.  -     naproxen 500 MG Oral Tab; Take 1 tablet (500 mg total) by mouth 2 (two) times daily with meals. Take with meals (for pain/inflammation)  Note given that she cannot work until reevaluated by me.  Anxiety  Reassurance.  She had been off the sertraline for some time but still has some at home and feels that she needs to get back on it.  She will start with 1/2 tablet/day for 1 week and then go up to a full tablet.      Referrals (if applicable)  No orders of the defined types were placed in this encounter.        Follow up if symptoms persist.  Take medicine (if given) as prescribed.  Approach to treatment discussed and patient/family member understands and agrees to plan.     Return in about 1 week (around 5/6/2025) for back pain followup.      Assessment & Plan      Lauro Samano,   4/29/2025

## 2025-04-29 NOTE — TELEPHONE ENCOUNTER
Patient was seen by Dr. Samano today (4/29/25), and new note was given to patient today.  No further actions needed.

## 2025-05-08 NOTE — TELEPHONE ENCOUNTER
Yennifer from [employer] calling to confirm office dates and follow up scheduled for patient's injury.  Office dates provided.

## 2025-05-09 NOTE — PROGRESS NOTES
Patient ID: Ruthei Anderson is a 34 year old female.         The following individual(s) verbally consented to be recorded using ambient AI listening technology and understand that they can each withdraw their consent to this listening technology at any point by asking the clinician to turn off or pause the recording:    Patient name: Ruthie Anderson  Additional names:         HPI  Chief Complaint   Patient presents with    Follow - Up     Back injury f/u        History of Present Illness  Ruthie Anderson is a 34 year old female who presents for follow-up of back pain.    The back pain began after a fall down the stairs approximately two weeks ago. Initially, there was tenderness over the lumbar paraspinal muscles from L4 to S1 bilaterally, especially over the mid sacrum, with significant discomfort when rising from a seated position.    The discomfort persists, although the medication regimen has been effective. She is currently taking naproxen twice daily and cyclobenzaprine up to three times a day as needed for muscle spasms, typically twice daily. She occasionally uses Tylenol for additional pain relief. The pain is now dull and does not radiate, and comes and goes now and is exacerbated by activities such as cleaning the house.    The pain is described as very tender, particularly in the sacral area, and is affected by sitting and she gets some relief by using a cushion designed cushion for tailbone relief. She uses a cushion in the car to maintain her back arch and relieve pressure, but driving remains uncomfortable. She has been icing the area.    She engages in light physical activity at home, but prolonged activity increases her pain. Her boyfriend has noted that she may be overexerting herself when she feels better. She has been using a heating pad for cramps, which she found helpful.    An x-ray on April 23, 2025, showed no fractures, but she continues to experience  significant pain in the sacral region.  She would like to go back to work this coming Monday but with the ability to get up and stretch as long episodes of sitting caused her quite a bit of discomfort.    Wt Readings from Last 6 Encounters:   25 208 lb 3.2 oz (94.4 kg)   25 213 lb (96.6 kg)   25 206 lb (93.4 kg)   25 195 lb (88.5 kg)   25 200 lb (90.7 kg)   23 182 lb (82.6 kg)       BMI Readings from Last 6 Encounters:   25 36.88 kg/m²   25 37.73 kg/m²   25 36.49 kg/m²   25 34.54 kg/m²   25 35.43 kg/m²   23 32.24 kg/m²       BP Readings from Last 6 Encounters:   25 105/68   25 106/70   25 121/76   25 110/74   23 121/60   22 106/70       Results  RADIOLOGY  Lumbar spine X-ray: No fractures (2025)    Review of Systems  No exertional cardiac chest pain or shortness of breath unless stated in HPI which would take precedence.  See HPI for further review of systems.        Medical History:      Past Medical History:    Diabetes mellitus (HCC)    Per pt, Pre Diabetic    History of ovarian cyst    Irregular periods    Obesity    Obesity (BMI 30-39.9)    PCOS (polycystic ovarian syndrome)    Prediabetes    Pregnancy (HCC)    per [facility]:  \"Pregnancy\"; \"Management:  \"; \"Facility:  [facility]\"; \"Outcome/detail:  40 week 6lbs 12 oz Female\"; \"Comments:  patient was induced\"    Pregnancy (HCC)    per [facility]:  \"Pregnancy\"; \"Management:  \"; \"Provider:  Last [facility]\"; \"Comments:  Chester was a full term live born 6 pounds 12 ounces, delivered by \"       Past Surgical History:   Procedure Laterality Date      ,     Removal gallbladder      Tubal ligation            Current Outpatient Medications   Medication Sig Dispense Refill    cyclobenzaprine 10 MG Oral Tab Take 1 tablet (10 mg total) by mouth 3 (three) times daily as needed for Muscle spasms. As needed for muscle relaxation. Can  make tired. 30 tablet 0    naproxen 500 MG Oral Tab Take 1 tablet (500 mg total) by mouth 2 (two) times daily with meals. Take with meals (for pain/inflammation) 60 tablet 0    sertraline 50 MG Oral Tab Take 1/2 tablet po daily for 1 week and then go to 1 full tablet each day from then on.  (for mood) (Patient not taking: Reported on 5/9/2025) 90 tablet 0     Allergies:  Allergies   Allergen Reactions    Codeine HIVES    Codeine ITCHING        Physical Exam:       Physical Exam  Height 5' 3\" (1.6 m), weight 208 lb 3.2 oz (94.4 kg), last menstrual period 04/12/2025, not currently breastfeeding.  Vitals:    05/09/25 1411   BP: 102/70   BP Location: Right arm   Patient Position: Sitting   Cuff Size: large   Pulse: 60   Temp: 97 °F (36.1 °C)   TempSrc: Tympanic   Weight: 208 lb 3.2 oz (94.4 kg)   Height: 5' 3\" (1.6 m)          Constitutional: Patient is oriented to person, place, and time. Patient appears well-developed and well-nourished. No distress but definitely in some pain here..    Neurological: Patient is alert and oriented to person, place, and time.   Skin: Skin is warm and dry.  No pallor or diaphoresis.  Psychiatric: Patient has normal affect and mood       Nursing note and vitals reviewed.     --------------------------------------------------------------  BACK:      Tenderness over the sacrum down the midline.  No crepitus.  I did not see any bruising at this time.  Not much tenderness in the lumbar area now.  Gait: She was able to get up from a seated position much easier than before but still with visible discomfort.  Her gait was a bit slow but not terribly antalgic when I had her get up onto the table.  Straight leg raise was negative.  Quadricep strength is full.     ------------------------------------------------    Physical Exam        Assessment/Plan:        Diagnoses and all orders for this visit:    Sacrococcygeal pain  -     XR SACRUM + COCCYX (MIN 2 VIEWS) (CPT=72220); Future  -     naproxen  500 MG Oral Tab; Take 1 tablet (500 mg total) by mouth 2 (two) times daily with meals. Take with meals (for pain/inflammation)  X-ray just to rule out an occult fracture.  She agrees.  She is getting some great relief with the Naprosyn and cyclobenzaprine and I will go ahead and refill those.  Sacral contusion, subsequent encounter  -     XR SACRUM + COCCYX (MIN 2 VIEWS) (CPT=72220); Future  -     naproxen 500 MG Oral Tab; Take 1 tablet (500 mg total) by mouth 2 (two) times daily with meals. Take with meals (for pain/inflammation)    Acute bilateral low back pain without sciatica  -     cyclobenzaprine 10 MG Oral Tab; Take 1 tablet (10 mg total) by mouth 3 (three) times daily as needed for Muscle spasms. As needed for muscle relaxation. Can make tired.    Bilateral buttock pain  -     cyclobenzaprine 10 MG Oral Tab; Take 1 tablet (10 mg total) by mouth 3 (three) times daily as needed for Muscle spasms. As needed for muscle relaxation. Can make tired.        Referrals (if applicable)  No orders of the defined types were placed in this encounter.        Follow up if symptoms persist.  Take medicine (if given) as prescribed.  Approach to treatment discussed and patient/family member understands and agrees to plan.     No follow-ups on file.      Assessment & Plan  Acute bilateral low back pain without sciatica  Persistent lumbar discomfort from L4 to S1 with mid sacrum tenderness, likely due to inflammation from a fall. No fractures on initial x-ray, but persistent pain suggests possible hidden fracture or prolonged inflammation. Current medications, naproxen and cyclobenzaprine, effectively manage symptoms.  - Order sacrum and coccyx x-ray to rule out hidden fracture  - Prescribe cyclobenzaprine 10 mg orally three times daily for one month  - Refill naproxen 500 mg orally twice daily  - Advise use of moist heat for pain relief  - Recommend use of a cushion for tailbone relief    Sacral contusion  Suspected sacral  contusion from a fall with persistent sacral pain and tenderness. No fracture on initial x-ray, but possible hidden fracture or prolonged contusion-related inflammation. Moist heat recommended for pain management.  - Order sacrum and coccyx x-ray to rule out hidden fracture  - Advise use of moist heat for pain relief  - Recommend use of a cushion for tailbone relief    Bilateral buttock pain  Ongoing buttock tenderness exacerbated by sitting and certain movements, likely related to fall-induced inflammation or contusion. Cushion use provides partial relief.  - Advise use of moist heat for pain relief  - Recommend use of a cushion for tailbone relief    Lauro Samano DO  5/9/2025

## (undated) NOTE — MR AVS SNAPSHOT
[address], [address]  [address] [facility]  [phone]               Thank you for choosing us for your health care visit with Gris Lozano MD.  We are glad to serve you and happy to provide you with this summ discharge instructions in [employer] by going to Visits < Admission Summaries. If you've been to the Emergency Department or your doctor's office, you can view your past visit information in [employer] by going to Visits < Visit Summaries. [employer] questions?

## (undated) NOTE — LETTER
Date & Time: 3/23/2022, 11:42 AM  Patient: Kayley Ellsi  Encounter Provider(s):    GWEN Amezcua       To Whom It May Concern:    Kelvin Daniel was seen and treated in our department on 3/23/2022. She should not return to work until 03/26/2022. If you have any questions or concerns, please do not hesitate to call.       DERECK Barrera  _____________________________  [id number] Signature

## (undated) NOTE — LETTER
5/9/2025              Ruthie Anderson        [address]        [place] [address]         To whom it may concern,    Ruthie Anderson is currently a patient under my medical care.  Patient was seen today and can return back to work on May 12, 2025 but because of the injury to her low back that was not work-related she needs to be able to get up and stretch as frequently as she needs to.  These restrictions will be in place for 1 full month.  If you require additional information please do not hesitate to contact my office.        Sincerely,     Lauro Samano DO  [facility]  [address]  [place] [address] [phone]        Document electronically generated by:  Lauro Samano DO

## (undated) NOTE — MR AVS SNAPSHOT
[address] [facility]  [address] [phone]               Thank you for choosing us for your health care visit with Jonah Garner MD.  We are glad to serve you and happy to provide you with th You can access your MyChart to more actively manage your health care and view more details from this visit by going to [email or web address].   If you've recently had a stay at the Hospital you can access your discharge instructions in [address] by beau

## (undated) NOTE — MR AVS SNAPSHOT
[address]  [address] [facility]  [phone]               Thank you for choosing us for your health care visit with Sobeida Mckeon MD.  We are glad to serve you and happy to provide you with this summ Take 1 tablet (850 mg total) by mouth 2 (two) times daily with meals. Take one tablet by mouth daily for the first two weeks. Commonly known as:  GLUCOPHAGE           Multi Vitamin/Minerals Tabs   Take by mouth.            PROBIOTIC DAILY OR   Take by dharmesh

## (undated) NOTE — LETTER
4/29/2025              Ruthie Anderson        [address]        [place] [address]         To whom it may concern,    Ruthie Anderson is currently a patient under my medical care.  Patient injured herself on April 20, 2025.  She still cannot work as she is in quite a bit of pain after I examined her today which is April 29, 2025.  She will follow-up with me on May 6, 2025 at which time we will see if she can return back to work without restrictions or possibly with light duty restrictions.   If you require additional information please do not hesitate to contact my office.        Sincerely,     Lauro Samano DO  [facility]  [address]  [facility] [place] [address] [phone]        Document electronically generated by:  Lauro Samano DO

## (undated) NOTE — LETTER
4/23/2025          To Whom It May Concern:    Ruthie Anderson is currently under my medical care and may not return to work at this time.      She may return to work on 04/28/2025.  Activity is restricted as follows: none.    If you require additional information please contact our office.        Sincerely,    Tom Javed PA-C          Document generated by:  Tom Javed PA-C